# Patient Record
Sex: FEMALE | Race: WHITE
[De-identification: names, ages, dates, MRNs, and addresses within clinical notes are randomized per-mention and may not be internally consistent; named-entity substitution may affect disease eponyms.]

---

## 2019-04-12 ENCOUNTER — HOSPITAL ENCOUNTER (EMERGENCY)
Dept: HOSPITAL 25 - UCCORT | Age: 64
Discharge: HOME | End: 2019-04-12
Payer: COMMERCIAL

## 2019-04-12 VITALS — SYSTOLIC BLOOD PRESSURE: 149 MMHG | DIASTOLIC BLOOD PRESSURE: 85 MMHG

## 2019-04-12 DIAGNOSIS — R10.9: Primary | ICD-10-CM

## 2019-04-12 DIAGNOSIS — K57.90: ICD-10-CM

## 2019-04-12 PROCEDURE — 99211 OFF/OP EST MAY X REQ PHY/QHP: CPT

## 2019-04-12 PROCEDURE — 81003 URINALYSIS AUTO W/O SCOPE: CPT

## 2019-04-12 PROCEDURE — 36415 COLL VENOUS BLD VENIPUNCTURE: CPT

## 2019-04-12 PROCEDURE — 83690 ASSAY OF LIPASE: CPT

## 2019-04-12 PROCEDURE — 74176 CT ABD & PELVIS W/O CONTRAST: CPT

## 2019-04-12 PROCEDURE — 85025 COMPLETE CBC W/AUTO DIFF WBC: CPT

## 2019-04-12 PROCEDURE — 80053 COMPREHEN METABOLIC PANEL: CPT

## 2019-04-12 PROCEDURE — G0463 HOSPITAL OUTPT CLINIC VISIT: HCPCS

## 2019-04-12 NOTE — UC
Abdominal Pain Female HPI





- HPI Summary


HPI Summary: 


63-year-old woman comes in with a chief complaint of left-sided abdominal pain.

  Pain started yesterday.  It's been continuous.  It waxes and wanes in 

intensity.  At its worse it's 6 out of 10.  Right now to 2 out of 10.  Movement 

does not seem to make the pain worse.  No nausea.  Patient reports normal bowel 

movements which are slightly constipated she has IBS.  No urinary symptoms.  

Has not seen any hematuria.  No fevers or chills.  Patient does have rheumatoid 

arthritis and is on medications therefore she is immunocompromised.  Denies any 

history of kidney stones or diverticulitis. No rash.





- History of Current Complaint


Chief Complaint: UCAbdominalPain


Stated Complaint: LEFT SIDED PAIN


Time Seen by Provider: 19 18:11


Pain Intensity: 6


Allergies/Adverse Reactions: 


 Allergies











Allergy/AdvReac Type Severity Reaction Status Date / Time


 


ciprofloxacin [From Cipro HC] Allergy Unknown Nausea Verified 19 17:55


 


codeine Allergy Unknown Nausea Verified 19 17:55


 


hydrocortisone Allergy Unknown Nausea Verified 19 17:55





[From Cipro HC]     


 


NSAIDS (Non-Steroidal Allergy Unknown acid relux Verified 19 17:55





Anti-Inflamma     











Home Medications: 


 Home Medications





Jelzantz 11 mg PO DAILY 19 [History]


Meloxicam 7.5 mg PO DAILY 19 [History Confirmed 19]


Ranitidine TAB (NF) [Zantac TAB (NF)] 150 mg PO BID 19 [History Confirmed 

19]


Sucralfate TAB* [Carafate*] 1 gm PO BID PRN 19 [History Confirmed 19

]











PMH/Surg Hx/FS Hx/Imm Hx


Previously Healthy: Yes - RA


Endocrine History: Dyslipidemia


Cardiovascular History: Hypertension





- Surgical History


Surgical History: Yes


Surgery Procedure, Year, and Place: c-sectx2, hysterectomy, bladder lift, nasal

, left breast.  EYE SURGERY





- Family History


Known Family History: Positive: Non-Contributory





- Social History


Alcohol Use: Rare


Substance Use Type: None


Smoking Status (MU): Never Smoked Tobacco





- Immunization History


Most Recent Influenza Vaccination: current





Review of Systems


All Other Systems Reviewed And Are Negative: Yes


Constitutional: Positive: Negative


Skin: Positive: Negative


Eyes: Positive: Negative


ENT: Positive: Negative


Respiratory: Positive: Negative


Cardiovascular: Positive: Negative


Gastrointestinal: Positive: Other - see hpi


Genitourinary: Positive: Negative


Motor: Positive: Negative


Neurovascular: Positive: Negative


Musculoskeletal: Positive: Negative


Neurological: Positive: Negative


Psychological: Positive: Negative


Is Patient Immunocompromised?: Yes - on xeljanz





Physical Exam


Triage Information Reviewed: Yes


Appearance: Well-Appearing, Well-Nourished, Pain Distress - mild


Vital Signs: 


 Initial Vital Signs











Temp  97.5 F   19 18:00


 


Pulse  65   19 18:00


 


Resp  18   19 18:00


 


BP  149/85   19 18:00


 


Pulse Ox  100   19 18:00











Vital Signs Reviewed: Yes


Eye Exam: Normal


Eyes: Positive: Conjunctiva Clear


Neck: Positive: Supple


Respiratory: Positive: Lungs clear, Normal breath sounds, No respiratory 

distress


Cardiovascular: Positive: RRR


Abdomen Description: Positive: Soft, Other: - Mild tenderness to palpation left 

lateral abd..  Negative: CVA Tenderness (R), CVA Tenderness (L), Distended, 

Guarding


Musculoskeletal Exam: Normal


Musculoskeletal: Positive: Strength Intact, ROM Intact


Neurological Exam: Normal


Neurological: Positive: Alert, Muscle Tone Normal


Psychological Exam: Normal


Psychological: Positive: Age Appropriate Behavior


Skin Exam: Normal


Skin: Positive: Other - no rash.  Negative: Rashes





Abd Pain Female Course/Dx





- Course


Course Of Treatment: 


EXAM:


CT Abdomen and Pelvis Without Contrast


EXAM DATE/TIME:


2019 7:05 PM


CLINICAL HISTORY:


63 years old, female; Pain; Abdominal pain; Acute; Prior surgery; Surgery


date: 6+ months; Surgery type: Hysterectomy, csect x 2, bladder lift, lt breast


surgery x 2; Patient HX: Lt sided abdominal pain since yesterday; Additional


info: Left abd pain


TECHNIQUE:


Imaging protocol: Axial computed tomography images of the abdomen and pelvis


without contrast.


Coronal and sagittal reformatted images were created and reviewed.


Radiation optimization: All CT scans at this facility use at least one of


these dose optimization techniques: automated exposure control; mA and/or kV


adjustment per patient size (includes targeted exams where dose is matched to


clinical indication); or iterative reconstruction.


COMPARISON:


OT PELVIS 1+ VWS 10/22/2018 3:27 PM


FINDINGS:


Lower thorax: There is mild bibasilar atelectatic change or scarring.


ABDOMEN:


Liver: There is a subcentimeter low attenuation lesion in the right lobe of


the liver that is too small to characterize.


Gallbladder and bile ducts: Normal. No calcified stones. No ductal dilation.


Pancreas: Normal. No ductal dilation.


Spleen: Normal. No splenomegaly.


Adrenals: Normal. No mass.


Kidneys and ureters: Normal. No hydronephrosis.


Stomach and bowel: There is sigmoid colonic diverticulosis without evidence


for acute diverticulitis.


Appendix: The appendix is unremarkable and seen best on axial image 57 of


series 2.


PELVIS:


Bladder: Unremarkable as visualized.


Reproductive: There are postoperative changes of hysterectomy.


ABDOMEN and PELVIS:


Intraperitoneal space: Normal. No free air. No significant fluid collection.


Bones/joints: There are degenerative changes of the spine and there is grade


1 anterolisthesis at L4-5, likely degenerative.


Soft tissues: There is a small fat filled umbilical hernia without signs of


incarceration.


Vasculature: There are atherosclerotic aortic and iliac and femoral artery


calcifications.


Lymph nodes: Normal. No enlarged lymph nodes.


IMPRESSION:


1. There is sigmoid colonic diverticulosis without evidence for acute


diverticulitis.


2. No other acute CT pathology.


COMMENT:


Consistent with the American College of Radiology's Incidental Findings


Committee Report (J Am Gabo Radiol 2010): Unless the patient's specific


circumstances suggest otherwise, any liver lesion 0.5 cm or less, any cystic


kidney lesion less than 1.0 cm, and/or any adrenal lesion 1.0 cm or less not


otherwise characterized in this report as possessing suspicious or


indeterminate imaging features is/are highly likely to be benign and do not


require follow-up imaging or biopsy.


To contact [facility] with a general question: [facility] - [phone]


For direct physician to physician contact: Physician Hotline - [phone]


[facility] ([facility] Facility ID #[id number])





======================== End of Report Content ====================== 


  


Attending Doctor: Patrick Mednes ([id number])


: Jonathan Granados ([id number])


: PRISCILA . (PRISCILA)


Report Date: 2019 18:34:00


Report Status: Final


======================= Begin of Report Content ======================





Patient Name: SHEKHAR BURGER Medical Record#: [id number]


Ordering Physician: Patrick Mendes MD Acct.#: [id number]


: 1955 Age: 63 Sex: F Location: URGENT CARE [facility]


Exam Date: 19 ADM Status: [facility] ER





Order Information: CT ABD/PEL W/O


Accession Number: [id number]


CPT: 55533


EXAM:


CT Abdomen and Pelvis Without Contrast





EXAM DATE/TIME:


2019 7:05 PM





CLINICAL HISTORY:


63 years old, female; Pain; Abdominal pain; Acute; Prior surgery; Surgery


date: 6+ months; Surgery type: Hysterectomy, csect x 2, bladder lift, lt breast


surgery x 2; Patient HX: Lt sided abdominal pain since yesterday; Additional


info: Left abd pain





TECHNIQUE:


Imaging protocol: Axial computed tomography images of the abdomen and pelvis


without contrast.


Coronal and sagittal reformatted images were created and reviewed.


Radiation optimization: All CT scans at this facility use at least one of


these dose optimization techniques: automated exposure control; mA and/or kV


adjustment per patient size (includes targeted exams where dose is matched to


clinical indication); or iterative reconstruction.





COMPARISON:


OT PELVIS 1+ VWS 10/22/2018 3:27 PM





FINDINGS:


Lower thorax: There is mild bibasilar atelectatic change or scarring.





ABDOMEN:


Liver: There is a subcentimeter low attenuation lesion in the right lobe of


the liver that is too small to characterize.


Gallbladder and bile ducts: Normal. No calcified stones. No ductal dilation.


Pancreas: Normal. No ductal dilation.


Spleen: Normal. No splenomegaly.


Adrenals: Normal. No mass.


Kidneys and ureters: Normal. No hydronephrosis.


Stomach and bowel: There is sigmoid colonic diverticulosis without evidence


for acute diverticulitis.


Appendix: The appendix is unremarkable and seen best on axial image 57 of


series 2.





PELVIS:


Bladder: Unremarkable as visualized.


Reproductive: There are postoperative changes of hysterectomy.





ABDOMEN and PELVIS:


Intraperitoneal space: Normal. No free air. No significant fluid collection.


Bones/joints: There are degenerative changes of the spine and there is grade


1 anterolisthesis at L4-5, likely degenerative.


Soft tissues: There is a small fat filled umbilical hernia without signs of


incarceration.


Vasculature: There are atherosclerotic aortic and iliac and femoral artery


calcifications.


Lymph nodes: Normal. No enlarged lymph nodes.





IMPRESSION:


1. There is sigmoid colonic diverticulosis without evidence for acute


diverticulitis.


2. No other acute CT pathology.





COMMENT:


Consistent with the American College of Radiology's Incidental Findings


Committee Report (J Am Gabo Radiol 2010): Unless the patient's specific


circumstances suggest otherwise, any liver lesion 0.5 cm or less, any cystic


kidney lesion less than 1.0 cm, and/or any adrenal lesion 1.0 cm or less not


otherwise characterized in this report as possessing suspicious or


indeterminate imaging features is/are highly likely to be benign and do not


require follow-up imaging or biopsy.





To contact [facility] with a general question: [facility] - [phone]


For direct physician to physician contact: Physician Hotline - [phone]


[facility] ([facility] Facility ID #[id number])








____________________________________________________________


<Electronically signed by Jonathan Granados MD in OV> 19








Patient had a CT of her abdomen and pelvis with no contrast.  Also herb blood 

work for CBC CMP and lipase.  Urine had a trace of blood and otherwise was 

negative.  Patient preferred to not wait for the CT results.  She was 

discharged from the clinic prior to results being available.  She did let me 

know that I can call her with the results.  We also discussed that if her 

condition did not improve or she worsens she needs to go to the emergency 

department.  She is aware that she is immunocompromised due to her rheumatoid 

arthritis medication.





The CT report came back and I discussed the CT report with the patient over the 

phone.  At this time the plan is to follow-up with her primary care doctor get 

reevaluated sooner if worse or any questions concerns.





- Differential Dx/Diagnosis


Provider Diagnosis: 


 Left sided abdominal pain








Discharge





- Sign-Out/Discharge


Documenting (check all that apply): Patient Departure


All imaging exams completed and their final reports reviewed: Yes





- Discharge Plan


Condition: Stable


Disposition: HOME


Patient Education Materials:  Acute Abdominal Pain (ED)


Referrals: 


Sanket Khoury DO [Primary Care Provider] - 


Additional Instructions: 


FOLLOW UP WITH YOUR DOCTOR. 


GO TO THE EMERGENCY DEPARTMENT FOR WORSENING OF YOUR CONDITION; PAIN, FEVER, 

YOU FEEL ILL OR QUESTIONS OR CONCERNS.








- Billing Disposition and Condition


Condition: STABLE


Disposition: Home

## 2019-04-13 LAB
ALBUMIN SERPL BCG-MCNC: 4.3 G/DL (ref 3.2–5.2)
ALBUMIN/GLOB SERPL: 1.5 {RATIO} (ref 1–3)
ALP SERPL-CCNC: 72 U/L (ref 34–104)
ALT SERPL W P-5'-P-CCNC: 16 U/L (ref 7–52)
ANION GAP SERPL CALC-SCNC: 5 MMOL/L (ref 2–11)
AST SERPL-CCNC: 21 U/L (ref 13–39)
BASOPHILS # BLD AUTO: 0 10^3/UL (ref 0–0.2)
BUN SERPL-MCNC: 17 MG/DL (ref 6–24)
BUN/CREAT SERPL: 23.9 (ref 8–20)
CALCIUM SERPL-MCNC: 9.7 MG/DL (ref 8.6–10.3)
CHLORIDE SERPL-SCNC: 102 MMOL/L (ref 101–111)
EOSINOPHIL # BLD AUTO: 0.1 10^3/UL (ref 0–0.6)
GLOBULIN SER CALC-MCNC: 2.8 G/DL (ref 2–4)
GLUCOSE SERPL-MCNC: 82 MG/DL (ref 70–100)
HCO3 SERPL-SCNC: 30 MMOL/L (ref 22–32)
HCT VFR BLD AUTO: 39 % (ref 33–41)
HGB BLD-MCNC: 13 G/DL (ref 12–16)
LYMPHOCYTES # BLD AUTO: 3.1 10^3/UL (ref 1–4.8)
MCH RBC QN AUTO: 30 PG (ref 27–31)
MCHC RBC AUTO-ENTMCNC: 33 G/DL (ref 31–36)
MCV RBC AUTO: 89 FL (ref 80–97)
MONOCYTES # BLD AUTO: 0.5 10^3/UL (ref 0–0.8)
NEUTROPHILS # BLD AUTO: 6.1 10^3/UL (ref 1.5–7.7)
NRBC # BLD AUTO: 0 10^3/UL
NRBC BLD QL AUTO: 0.2
PLATELET # BLD AUTO: 294 10^3/UL (ref 150–450)
POTASSIUM SERPL-SCNC: 4.1 MMOL/L (ref 3.5–5)
PROT SERPL-MCNC: 7.1 G/DL (ref 6.4–8.9)
RBC # BLD AUTO: 4.39 10^6 /UL (ref 3.7–4.87)
SODIUM SERPL-SCNC: 137 MMOL/L (ref 135–145)
WBC # BLD AUTO: 9.9 10^3/UL (ref 3.5–10.8)

## 2019-04-14 NOTE — UC
- Progress Note


Progress Note: 





Lipase elevated


If worse go to ER


If better see PCP early this week





Course/Dx





- Diagnoses


Provider Diagnoses: 


 Left sided abdominal pain








Discharge





- Sign-Out/Discharge


Documenting (check all that apply): Post-Discharge Follow Up


All imaging exams completed and their final reports reviewed: Yes





- Discharge Plan


Condition: Stable


Disposition: HOME


Patient Education Materials:  Acute Abdominal Pain (ED)


Referrals: 


Sanket Khoury DO [Primary Care Provider] - 


Additional Instructions: 


FOLLOW UP WITH YOUR DOCTOR. 


GO TO THE EMERGENCY DEPARTMENT FOR WORSENING OF YOUR CONDITION; PAIN, FEVER, 

YOU FEEL ILL OR QUESTIONS OR CONCERNS.








- Billing Disposition and Condition


Condition: STABLE


Disposition: Home